# Patient Record
Sex: MALE | NOT HISPANIC OR LATINO | URBAN - METROPOLITAN AREA
[De-identification: names, ages, dates, MRNs, and addresses within clinical notes are randomized per-mention and may not be internally consistent; named-entity substitution may affect disease eponyms.]

---

## 2017-01-05 ENCOUNTER — AMBULATORY - HEALTHEAST (OUTPATIENT)
Dept: SURGERY | Facility: CLINIC | Age: 42
End: 2017-01-05

## 2017-01-05 ENCOUNTER — OFFICE VISIT - HEALTHEAST (OUTPATIENT)
Dept: SURGERY | Facility: CLINIC | Age: 42
End: 2017-01-05

## 2017-01-05 DIAGNOSIS — N61.0 NIPPLE INFECTION: ICD-10-CM

## 2017-01-06 ENCOUNTER — RECORDS - HEALTHEAST (OUTPATIENT)
Dept: ADMINISTRATIVE | Facility: OTHER | Age: 42
End: 2017-01-06

## 2017-01-10 ENCOUNTER — AMBULATORY - HEALTHEAST (OUTPATIENT)
Dept: SURGERY | Facility: CLINIC | Age: 42
End: 2017-01-10

## 2017-01-18 ENCOUNTER — AMBULATORY - HEALTHEAST (OUTPATIENT)
Dept: SURGERY | Facility: CLINIC | Age: 42
End: 2017-01-18

## 2017-01-20 ENCOUNTER — AMBULATORY - HEALTHEAST (OUTPATIENT)
Dept: SURGERY | Facility: CLINIC | Age: 42
End: 2017-01-20

## 2017-01-20 ENCOUNTER — RECORDS - HEALTHEAST (OUTPATIENT)
Dept: ADMINISTRATIVE | Facility: OTHER | Age: 42
End: 2017-01-20

## 2017-01-21 ENCOUNTER — OFFICE VISIT - HEALTHEAST (OUTPATIENT)
Dept: FAMILY MEDICINE | Facility: CLINIC | Age: 42
End: 2017-01-21

## 2017-01-21 DIAGNOSIS — S21.002D OPEN WOUND OF LEFT BREAST, SUBSEQUENT ENCOUNTER: ICD-10-CM

## 2017-01-31 ENCOUNTER — OFFICE VISIT - HEALTHEAST (OUTPATIENT)
Dept: SURGERY | Facility: CLINIC | Age: 42
End: 2017-01-31

## 2017-01-31 DIAGNOSIS — Z48.89 POSTOPERATIVE VISIT: ICD-10-CM

## 2017-02-21 ENCOUNTER — OFFICE VISIT - HEALTHEAST (OUTPATIENT)
Dept: SURGERY | Facility: CLINIC | Age: 42
End: 2017-02-21

## 2017-02-21 DIAGNOSIS — Z48.89 POSTOPERATIVE VISIT: ICD-10-CM

## 2017-09-11 ENCOUNTER — COMMUNICATION - HEALTHEAST (OUTPATIENT)
Dept: INTERNAL MEDICINE | Facility: CLINIC | Age: 42
End: 2017-09-11

## 2018-03-01 ENCOUNTER — COMMUNICATION - HEALTHEAST (OUTPATIENT)
Dept: INTERNAL MEDICINE | Facility: CLINIC | Age: 43
End: 2018-03-01

## 2020-02-06 ENCOUNTER — OFFICE VISIT - HEALTHEAST (OUTPATIENT)
Dept: FAMILY MEDICINE | Facility: CLINIC | Age: 45
End: 2020-02-06

## 2020-02-06 DIAGNOSIS — F41.1 GAD (GENERALIZED ANXIETY DISORDER): ICD-10-CM

## 2020-02-06 DIAGNOSIS — Z76.89 ENCOUNTER TO ESTABLISH CARE: ICD-10-CM

## 2020-02-06 DIAGNOSIS — Z00.00 ROUTINE GENERAL MEDICAL EXAMINATION AT A HEALTH CARE FACILITY: ICD-10-CM

## 2020-02-06 DIAGNOSIS — K59.00 CONSTIPATION, UNSPECIFIED CONSTIPATION TYPE: ICD-10-CM

## 2020-02-06 DIAGNOSIS — R53.83 FATIGUE, UNSPECIFIED TYPE: ICD-10-CM

## 2020-02-06 LAB
ERYTHROCYTE [DISTWIDTH] IN BLOOD BY AUTOMATED COUNT: 11.9 % (ref 11–14.5)
HBA1C MFR BLD: 7.3 % (ref 3.5–6)
HCT VFR BLD AUTO: 46 % (ref 40–54)
HGB BLD-MCNC: 15.3 G/DL (ref 14–18)
MCH RBC QN AUTO: 30.4 PG (ref 27–34)
MCHC RBC AUTO-ENTMCNC: 33.2 G/DL (ref 32–36)
MCV RBC AUTO: 92 FL (ref 80–100)
PLATELET # BLD AUTO: 224 THOU/UL (ref 140–440)
PMV BLD AUTO: 7.8 FL (ref 7–10)
RBC # BLD AUTO: 5.02 MILL/UL (ref 4.4–6.2)
WBC: 7.5 THOU/UL (ref 4–11)

## 2020-02-06 ASSESSMENT — MIFFLIN-ST. JEOR: SCORE: 1992.61

## 2020-02-06 ASSESSMENT — PATIENT HEALTH QUESTIONNAIRE - PHQ9: SUM OF ALL RESPONSES TO PHQ QUESTIONS 1-9: 10

## 2020-02-06 ASSESSMENT — ANXIETY QUESTIONNAIRES
6. BECOMING EASILY ANNOYED OR IRRITABLE: NEARLY EVERY DAY
7. FEELING AFRAID AS IF SOMETHING AWFUL MIGHT HAPPEN: SEVERAL DAYS
2. NOT BEING ABLE TO STOP OR CONTROL WORRYING: MORE THAN HALF THE DAYS
3. WORRYING TOO MUCH ABOUT DIFFERENT THINGS: MORE THAN HALF THE DAYS
1. FEELING NERVOUS, ANXIOUS, OR ON EDGE: SEVERAL DAYS
IF YOU CHECKED OFF ANY PROBLEMS ON THIS QUESTIONNAIRE, HOW DIFFICULT HAVE THESE PROBLEMS MADE IT FOR YOU TO DO YOUR WORK, TAKE CARE OF THINGS AT HOME, OR GET ALONG WITH OTHER PEOPLE: SOMEWHAT DIFFICULT
4. TROUBLE RELAXING: MORE THAN HALF THE DAYS
GAD7 TOTAL SCORE: 13
5. BEING SO RESTLESS THAT IT IS HARD TO SIT STILL: MORE THAN HALF THE DAYS

## 2020-02-07 LAB
ALBUMIN SERPL-MCNC: 4.1 G/DL (ref 3.5–5)
ALP SERPL-CCNC: 94 U/L (ref 45–120)
ALT SERPL W P-5'-P-CCNC: 70 U/L (ref 0–45)
ANION GAP SERPL CALCULATED.3IONS-SCNC: 7 MMOL/L (ref 5–18)
AST SERPL W P-5'-P-CCNC: 34 U/L (ref 0–40)
BILIRUB SERPL-MCNC: 0.3 MG/DL (ref 0–1)
BUN SERPL-MCNC: 12 MG/DL (ref 8–22)
CALCIUM SERPL-MCNC: 9 MG/DL (ref 8.5–10.5)
CHLORIDE BLD-SCNC: 105 MMOL/L (ref 98–107)
CHOLEST SERPL-MCNC: 176 MG/DL
CO2 SERPL-SCNC: 25 MMOL/L (ref 22–31)
CREAT SERPL-MCNC: 0.86 MG/DL (ref 0.7–1.3)
FASTING STATUS PATIENT QL REPORTED: NO
GFR SERPL CREATININE-BSD FRML MDRD: >60 ML/MIN/1.73M2
GLUCOSE BLD-MCNC: 180 MG/DL (ref 70–125)
HDLC SERPL-MCNC: 34 MG/DL
LDLC SERPL CALC-MCNC: 119 MG/DL
POTASSIUM BLD-SCNC: 4.7 MMOL/L (ref 3.5–5)
PROT SERPL-MCNC: 7.2 G/DL (ref 6–8)
SODIUM SERPL-SCNC: 137 MMOL/L (ref 136–145)
TRIGL SERPL-MCNC: 115 MG/DL
TSH SERPL DL<=0.005 MIU/L-ACNC: 1.87 UIU/ML (ref 0.3–5)

## 2020-02-11 ENCOUNTER — COMMUNICATION - HEALTHEAST (OUTPATIENT)
Dept: FAMILY MEDICINE | Facility: CLINIC | Age: 45
End: 2020-02-11

## 2020-02-11 DIAGNOSIS — K59.01 SLOW TRANSIT CONSTIPATION: ICD-10-CM

## 2020-02-11 RX ORDER — DOCUSATE SODIUM 100 MG/1
100 CAPSULE, LIQUID FILLED ORAL 2 TIMES DAILY PRN
Qty: 60 CAPSULE | Refills: 2 | Status: SHIPPED | OUTPATIENT
Start: 2020-02-11

## 2020-02-20 ENCOUNTER — OFFICE VISIT - HEALTHEAST (OUTPATIENT)
Dept: FAMILY MEDICINE | Facility: CLINIC | Age: 45
End: 2020-02-20

## 2020-02-20 DIAGNOSIS — R73.09 ELEVATED HEMOGLOBIN A1C: ICD-10-CM

## 2020-02-20 DIAGNOSIS — K64.4 EXTERNAL HEMORRHOIDS: ICD-10-CM

## 2020-02-20 DIAGNOSIS — K59.00 CONSTIPATION, UNSPECIFIED CONSTIPATION TYPE: ICD-10-CM

## 2020-02-20 DIAGNOSIS — E66.01 MORBID OBESITY (H): ICD-10-CM

## 2020-02-20 LAB — HBA1C MFR BLD: 7.3 % (ref 3.5–6)

## 2020-02-20 RX ORDER — POLYETHYLENE GLYCOL 3350 17 G/17G
17 POWDER, FOR SOLUTION ORAL DAILY
Qty: 100 EACH | Refills: 0 | Status: SHIPPED | OUTPATIENT
Start: 2020-02-20

## 2020-02-20 RX ORDER — MENTHOL AND ZINC OXIDE .44; 20.625 G/100G; G/100G
OINTMENT TOPICAL
Qty: 71 G | Refills: 0 | Status: SHIPPED | OUTPATIENT
Start: 2020-02-20

## 2020-03-12 ENCOUNTER — COMMUNICATION - HEALTHEAST (OUTPATIENT)
Dept: ADMINISTRATIVE | Facility: CLINIC | Age: 45
End: 2020-03-12

## 2020-03-23 ENCOUNTER — OFFICE VISIT - HEALTHEAST (OUTPATIENT)
Dept: FAMILY MEDICINE | Facility: CLINIC | Age: 45
End: 2020-03-23

## 2020-03-23 DIAGNOSIS — E66.01 MORBID OBESITY (H): ICD-10-CM

## 2020-03-23 DIAGNOSIS — K59.00 CONSTIPATION, UNSPECIFIED CONSTIPATION TYPE: ICD-10-CM

## 2020-03-23 DIAGNOSIS — F41.1 GAD (GENERALIZED ANXIETY DISORDER): ICD-10-CM

## 2020-03-23 DIAGNOSIS — E11.9 TYPE 2 DIABETES MELLITUS WITHOUT COMPLICATION, WITHOUT LONG-TERM CURRENT USE OF INSULIN (H): ICD-10-CM

## 2020-03-23 RX ORDER — BUPROPION HYDROCHLORIDE 300 MG/1
300 TABLET ORAL EVERY MORNING
Qty: 90 TABLET | Refills: 1 | Status: SHIPPED | OUTPATIENT
Start: 2020-03-23

## 2020-07-22 ENCOUNTER — COMMUNICATION - HEALTHEAST (OUTPATIENT)
Dept: EDUCATION SERVICES | Facility: CLINIC | Age: 45
End: 2020-07-22

## 2020-09-25 ENCOUNTER — RECORDS - HEALTHEAST (OUTPATIENT)
Dept: ADMINISTRATIVE | Facility: OTHER | Age: 45
End: 2020-09-25

## 2021-04-16 ENCOUNTER — AMBULATORY - HEALTHEAST (OUTPATIENT)
Dept: NURSING | Facility: CLINIC | Age: 46
End: 2021-04-16

## 2021-05-07 ENCOUNTER — AMBULATORY - HEALTHEAST (OUTPATIENT)
Dept: NURSING | Facility: CLINIC | Age: 46
End: 2021-05-07

## 2021-05-27 ASSESSMENT — PATIENT HEALTH QUESTIONNAIRE - PHQ9: SUM OF ALL RESPONSES TO PHQ QUESTIONS 1-9: 10

## 2021-05-28 ASSESSMENT — ANXIETY QUESTIONNAIRES: GAD7 TOTAL SCORE: 13

## 2021-05-30 VITALS — WEIGHT: 232 LBS | BODY MASS INDEX: 35.8 KG/M2

## 2021-05-30 VITALS — WEIGHT: 233.9 LBS | BODY MASS INDEX: 36.09 KG/M2

## 2021-06-04 VITALS
DIASTOLIC BLOOD PRESSURE: 72 MMHG | SYSTOLIC BLOOD PRESSURE: 118 MMHG | WEIGHT: 247.6 LBS | HEART RATE: 80 BPM | BODY MASS INDEX: 37.53 KG/M2 | HEIGHT: 68 IN

## 2021-06-04 VITALS
SYSTOLIC BLOOD PRESSURE: 130 MMHG | WEIGHT: 244.7 LBS | BODY MASS INDEX: 37.21 KG/M2 | HEART RATE: 84 BPM | DIASTOLIC BLOOD PRESSURE: 102 MMHG

## 2021-06-05 NOTE — PATIENT INSTRUCTIONS - HE
"Anxiety:  Start hydroxyzine 25mg every 8 hours as needed during the daytime for anxiety  Next week, start the Wellbutrin 150mg once daily.   We will help schedule you for a therapy appointment    Constipation:  Start colace docusate every day. We can explore these symptoms more at your next visit.    Weight Loss Strategies for Success: (start small, pick 1 or 2 goals each week)  1. Check out the [facility] website to learn about BODPOD and other options for fat/muscle assessments, cooking classes, fitness classes (pilates, yoga, kettleball, etc), , massage  and gym memberships.  (https://www.[employer].org/services/ways-to-wellness)  2. Have a  \"Table, chair, plate\" with every meal. Sit down to eat your food!  3. Brush your teeth after the last meal of the day. Prevents evening snacking.   4. Avoid sugary beverages. (pop, fancy coffees). Reduce alcohol.   5. Drink water instead.   6. No fast food (or reduce meals out to a specific #/week). Eat at home 90% of the time.  7. Keep a food log- apps like [employer] are very helpful for this.  8. Start the morning with breakfast. Every single day.   9. High protein (60g/day): Try to get protein in at least every 3.5 hours during the day to avoid a hunger surge late in the day.  10. If hungry, start with a protein serving, followed by water and then a crunchy vegetable that requires chewing (this decreases the hunger hormone).   11. Use plenty of healthy fats (olive oil, avocados, nuts) when cooking which will make you feel more satisfied.    12. Be intentional and think about what you are eating and feel the food fuel you.  13. Choose one day to be \"meal planning\" and/or \"meal prep\" days. Plan ahead for grocery shopping for healthy food choices, and spend 30min-1hr each week prepping your fruits/veggies (wash, chop, store in easy grab portions).  14. Make it easy to grab the protein (cut up chicken breasts, greek yogurt containers, hard boiled eggs, " etc)  15. Start low intensity exercise 30 minutes/day (walking/hiking/yard work).  16. Goal for 10,000 steps per day (consider a FitBit or other tracking device).  17. Don't step on the scale, but if you must: Measure inches, not weight!

## 2021-06-06 NOTE — PROGRESS NOTES
Assessment/plan   Daryn Cano is a 44 y.o. male who is established to my practice.    Daryn was seen today for hemorrhoids.    Diagnoses and all orders for this visit:    Presumed Internal hemorrhoids and some intermittent external hemorrhoids by clinical hx, not visible on exam today  Clinical history of hemorrhoids and has seen [facility] in the past for this.  He also had a history of anal fissure by his report that was improved with diltiazem/lidocaine compounded cream.  At this time I do not see evidence of a fissure.  Recommended calmoseptine cream.  Recommended avoidance of anal steroid Anusol type products which can thin the skin as much as possible.  Strong and long conversation regarding constipation management.  See next.  Pending improvement in symptoms, may require colonoscopy next.  -     menthol-zinc oxide (CALMOSEPTINE) 0.44-20.6 % Oint ointment; Apply twice daily to affected area.    Constipation, unspecified constipation type  Long conversation about constipation management. Has not optimized dietary fiber, hydration nor other OTC options for softer stools yet. Reviewed importance of these interventions, to start with miralax daily and colace up to twice daily for regular soft BMs in addition to dietary changes. We will revisit the constipation issue next month; can consider colonoscopy if sx not improving. TSH is normal. No anemia. Hb 15.  -     polyethylene glycol (MIRALAX) 17 gram packet; Take 1 packet (17 g total) by mouth daily.    Elevated hemoglobin A1c  Initial A1c 7.3% a few weeks ago, rechecking today to confirm diagnosis of diabetes.  We will discuss this further at the next visit as patient is quite resistant to this thought and already spent much time on the above issues  -     Glycosylated Hemoglobin A1c    Morbid obesity (H)  States his mood is doing really well with Wellbutrin initiation, however the hydroxyzine causes him to feel sleepy, irritated,    Follow up: 3-4  "weeks    Gail Duran MD    Subjective:      HPI: Daryn Cano is a 44 y.o. male who is here for:    Chief Complaint   Patient presents with     Hemorrhoids     follow up       Arrived late to his appointment today.    Discuss hemorrhoids: possible hemorrhoids (he calls these \"piles\").  When he strains at the toilet he notices tissue coming out and it will be quite painful for him.  Constipated for the past 3 to 4 years. Bloated/gasey and hard stools with straining.  He has pain with stooling.  He will sit on the toilet 7 times per day and passed small hard kayla but he has to sit and strain for a long period of time.  Sharp pain.  Sometimes feels like a skin crack or rash.  Passing a lot of flatus all day every day.  Says this is very embarrassing when he is at work, passing gas 20-30 times.  No family history of colon cancer.  He has seen [facility] in the past for what sounds like an anal fissure and management of hemorrhoids.  At that time they had recommended a colonoscopy but he did not want to have a scope performed. He had success with a diltiazem 2%/lidocaine 2% compounded ointment from [facility] at one point in the past for the \"piles\" as he calls them. (unclear if it was for fissure vs hemorrhoids at that time).     Initiated Wellbutrin 150 mg on 2/6 for generalized anxiety.  This medication is working really well to help keep him calm he feels.  Additionally with the hydroxyzine as needed was prescribed however he feels this creates agitation, foggy thinking, and sleepiness so he is no longer taking this 1.    Review of Systems:  Fatigue, see recent physical exam which reviewed this in detail and we will continue to address going forward  Constipation    12 point comprehensive review of systems was negative except as noted and HPI     Social History:  Social History     Social History Narrative    Technology . 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 Walks the dogs daily " 2miles a day. Former smoker ~2/2018. Rare alcohol.     Gail Duran MD       Medical History:  Patient Active Problem List   Diagnosis     Anxiety     BMI 37.0-37.9, adult     Constipation, unspecified constipation type     Fatigue, unspecified type     Impaired glucose tolerance- first A1c 7.3% 2/2020, recheck to confirm dx of diabetes; previously not seen in clinic regularily     Obesity (BMI 35.0-39.9) with comorbidity (H)     Past Medical History:   Diagnosis Date     Anal fissure      Past Surgical History:   Procedure Laterality Date     BREAST BIOPSY Left      BREAST SURGERY Left 02/19/2016    left periareolar abscess     Mass removed on the left chest       Patient has no known allergies.  Family History   Problem Relation Age of Onset     Coronary artery disease Other         PGF, great PGF, F     Cancer Maternal Grandmother         Blood     Breast cancer Maternal Grandmother      Diabetes Other         M, sister     Breast cancer Maternal Aunt 50     Diabetes Mother      Hypertension Mother      Heart disease Father      Diabetes Sister      Anesthesia problems Neg Hx        Medications:  Current Outpatient Medications   Medication Sig Dispense Refill     buPROPion (WELLBUTRIN XL) 150 MG 24 hr tablet Take 1 tablet (150 mg total) by mouth every morning. 30 tablet 2     hydrOXYzine HCl (ATARAX) 25 MG tablet Take 1 tablet (25 mg total) by mouth 3 (three) times a day as needed for anxiety. 30 tablet 0     docusate sodium (COLACE) 100 MG capsule Take 1 capsule (100 mg total) by mouth 2 (two) times a day as needed for constipation. 60 capsule 2     menthol-zinc oxide (CALMOSEPTINE) 0.44-20.6 % Oint ointment Apply twice daily to affected area. 71 g 0     polyethylene glycol (MIRALAX) 17 gram packet Take 1 packet (17 g total) by mouth daily. 100 each 0     No current facility-administered medications for this visit.        Imaging & Labs reviewed this visit: TSH is normal at 1.87      Objective:       Vitals:    02/20/20 1153   BP: 122/90   Pulse: 84   Weight: (!) 244 lb 11.2 oz (111 kg)       Physical Exam:   General: Alert, no acute distress.  Calmer than last visit, less excitable.   HEENT: normocephalic conjunctivae are clear. EOMI  Neck: supple   Lungs: Normal effort  Heart: regular rate  Abdomen: soft   Skin: clear without rash or lesions  Rectal: perirectal skin is intact and nontender to palpation; no visible fissuring; Anoscopy is quite uncomfortable for pt and small internal hemorrhoids noted but not able to advance due to discomfort. Single finger digital exam reveals smooth prostate, normal rectal vault and no lumps; hard stool is palpated at distal tip. No blood upon removing gloved finger.  Neuro: alert, oriented  Psych: mood good, affect appropriate, good eye contact, insight and judgment intact, normal speech pattern.        Gail Duran MD

## 2021-06-06 NOTE — TELEPHONE ENCOUNTER
Reached out to patient and relayed the below message. Patient is questioning if the covering provider can submit a prescription of Colace to the pharmacy. Patient stated it was discussed at his office visit, but the prescription was never sent. Please advise. Thank you, Kendra Humphrey

## 2021-06-06 NOTE — TELEPHONE ENCOUNTER
----- Message from Gail Duran MD sent at 2/10/2020  5:09 PM CST -----  Please call patient and ask if he wants a letter sent with these results:    Daryn, we tested your glucose control and you have very high blood sugars- it was 180 (but I know you were not fasting). We did a another test to see if you have diabetes by measuring the hemoglobin A1c over the past 3 months blood sugar levels and this is in the diabetic range at 7.3%. The diabetes range is having an A1c of 6.5% or higher. To diagnose diabetes, we typically have this test done at least twice to confirm, so at your visit on 2/20 we can recheck it and discuss what all this means further.  We can also talk about options for medications at that point.    Your thyroid function as measured by the TSH was normal.  Your hemoglobin was normal- no anemia.   Thanks,  Gail Duran MD

## 2021-06-07 NOTE — PATIENT INSTRUCTIONS - HE
"Daryn,  At your recent telephone visit we discussed the diagnosis of diabetes.   You have good goals for weight reduction (10-20lbs) and changing your diet habits significantly.   This will also help with your blood pressures.  I placed a referral to the diabetic educator to go through more nutrition concepts.   Below is a list of helpful ideas to consider as well.  We can recheck in office in about 6 months the hemoglobin A1c level.      Weight Loss Strategies for Success: (start small, pick 1 or 2 goals each week)  1. Check out the [employer] website to learn about BODPOD and other options for fat/muscle assessments, cooking classes, fitness classes (pilates, yoga, kettleball, etc), , massage  and gym memberships.  (https://www.[employer]/services/ways-to-wellness)  2. Have a  \"Table, chair, plate\" with every meal. Sit down to eat your food!  3. Brush your teeth after the last meal of the day. Prevents evening snacking.   4. Avoid sugary beverages. (pop, fancy coffees). Reduce alcohol.   5. Drink water instead.   6. No fast food (or reduce meals out to a specific #/week). Eat at home 90% of the time.  7. Keep a food log- apps like [employer] are very helpful for this.  8. Start the morning with breakfast. Every single day.   9. High protein (60g/day): Try to get protein in at least every 3.5 hours during the day to avoid a hunger surge late in the day.  10. If hungry, start with a protein serving, followed by water and then a crunchy vegetable that requires chewing (this decreases the hunger hormone).   11. Use plenty of healthy fats (olive oil, avocados, nuts) when cooking which will make you feel more satisfied.    12. Be intentional and think about what you are eating and feel the food fuel you.  13. Choose one day to be \"meal planning\" and/or \"meal prep\" days. Plan ahead for grocery shopping for healthy food choices, and spend 30min-1hr each week prepping your fruits/veggies (wash, chop, " store in easy grab portions).  14. Make it easy to grab the protein (cut up chicken breasts, greek yogurt containers, hard boiled eggs, etc)  15. Start low intensity exercise 30 minutes/day (walking/hiking/yard work).  16. Goal for 10,000 steps per day (consider a FitBit or other tracking device).  17. Don't step on the scale, but if you must: Measure inches, not weight!

## 2021-06-07 NOTE — PROGRESS NOTES
"Daryn Cano is a 44 y.o. male who is being evaluated via a billable telephone visit.      The patient has been notified of following:     \"This telephone visit will be conducted via a call between you and your physician/provider. We have found that certain health care needs can be provided without the need for a physical exam.  This service lets us provide the care you need with a short phone conversation.  If a prescription is necessary we can send it directly to your pharmacy.  If lab work is needed we can place an order for that and you can then stop by our lab to have the test done at a later time.    If during the course of the call the physician/provider feels a telephone visit is not appropriate, you will not be charged for this service.\"         Daryn Cano complains of    Chief Complaint   Patient presents with     Follow-up     concerned about diabetes- does not check sugars at all, constipation is better with miralax       I have reviewed and updated the patient's Past Medical History, Social History, Family History and Medication List.    ALLERGIES  Patient has no known allergies.      Renea Anderson LPN  ;  "

## 2021-06-07 NOTE — PROGRESS NOTES
PHONE VISIT  Due to current COVID19 pandemic, pt was offered virtual visit in lieu of in office evaluation to limit exposures.      Assessment/plan  Drayn Cano is a 44 y.o. male who is established to my practice.    Type 2 diabetes mellitus without complication, without long-term current use of insulin (H)  New diagnosis of diabetes reviewed.  A1c 7.3%.  Reviewed goal of less than 7%.  He is motivated for dietary and weight loss modifications prior to medication initiation.  He understands metformin and other medications can be helpful for weight loss and well however prefers to start with his own dietary changes which I think is a great start.  -ASCVD risk is 3.7%, declines statin medication at this time.  Aspirin not indicated.  -Diabetic educator referral placed  -Dietary changes reviewed, he has lots of low hanging fruit here with regard to sugary beverages, junk food, meats and other unhealthy choices including high carbs such as rice.  He states he knows what to do.  -Significant increase in physical activity encouraged, already walking 2 miles a day but at a slow pace  -Encouraged annual eye exam.  -Plan for microalbumin, A1c and foot exam at his follow-up in the fall  - Ambulatory referral to Diabetes Education Program (CDE)    Morbid obesity (H)  BMI 37.0-37.9, adult  Risks of obesity were discussed, including co-morbidities such as diabetes, HTN, HLD, CAD and stroke.   - encouraged moderate physical activity of 150 minutes per week  - encouraged healthy dietary choices like eating real foods, increasing protein & vegetables, and watching portion sizes.  - referral to dietician  - declines bariatric referral    PRAFUL (generalized anxiety disorder)  Doing well but he would like to explore potential dose increase in the Wellbutrin from 150 to 300 mg once daily.  Side effects again reviewed.  - buPROPion (WELLBUTRIN XL) 300 MG 24 hr tablet; Take 1 tablet (300 mg total) by mouth every morning.    Constipation,  "unspecified constipation type  Improved with MiraLAX.  Anticipate much improvement with increasing hydration and the above dietary changes as well       COVID19 precautions reviewed, questions answered. Referred to [facility] for latest updates.     Telephone visit start time: 2:54pm  Telephone visit end time: 3:17pm  Total time: 23min    Follow up: fall for physical/labs    Gail Duran MD    Subjective:      HPI: Daryn Cano is a 44 y.o. male who is being evaluated via a billable telephone visit due to COVID19 pandemic precautions.    Chief Complaint   Patient presents with     Follow-up     concerned about diabetes- does not check sugars at all, constipation is better with miralax       Follow up: New dx of diabetes. A1c 7.3% x2.   He endorses eating a lot of bad foods:   \"anything that causes a man to die, I eat these things\".   rice, chips, gyros, goat meat, mutton.   Meat is his weakness. He can think about reducing this  Drinks a lot of sprite/coke each day.  Enjoys sugary desserts and other junk foods.     He is motivated to start working on a diet changes.  He does not want to start taking medications at this point.    He used to be a heavy smoker and gave up over night. He feels if he puts his mind to it he can do these changes with regard to diet/exercise too.     He is taking dogs on a walk every day - 2 miles per day. Has been doing this for the past 5 years.     Mom, sister, grandfather on both sides with hx of diabetes.     Review of Systems:  Constipation is much better with miralax daily. Getting a soft BM daily now.   The hemorrhoids are a lot better also, the calmoseptine cream is helping.  The \"anger medication\" bupropion has been helping him calm down, and would like to consider a dose increase if possible. PRAFUL 7 is 5,  PHQ9 is 6.   12 point comprehensive review of systems was negative except as noted and HPI     Social History:  Social History     Social History Narrative    Technology delivery " manager. 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 Walks the dogs daily 2miles a day. Former smoker ~2/2018. Rare alcohol.     Gail Duran MD       Medical History:   I have reviewed and updated the patient's Past Medical History, Social History, Family History and Medication List.    Medications:  Current Outpatient Medications   Medication Sig Dispense Refill     buPROPion (WELLBUTRIN XL) 300 MG 24 hr tablet Take 1 tablet (300 mg total) by mouth every morning. 90 tablet 1     docusate sodium (COLACE) 100 MG capsule Take 1 capsule (100 mg total) by mouth 2 (two) times a day as needed for constipation. 60 capsule 2     menthol-zinc oxide (CALMOSEPTINE) 0.44-20.6 % Oint ointment Apply twice daily to affected area. 71 g 0     polyethylene glycol (MIRALAX) 17 gram packet Take 1 packet (17 g total) by mouth daily. 100 each 0     No current facility-administered medications for this visit.        Imaging & Labs reviewed this visit:   Lab Results   Component Value Date    HGBA1C 7.3 (H) 02/20/2020           Objective:      There were no vitals filed for this visit.    Physical Exam: Not performed in context of phone visit    Gail Duran MD

## 2021-06-08 NOTE — PROGRESS NOTES
Prior Authorization for patients I&D Surgery today has been approved. New reference #[id number]. This was approved today 1/20/17, however the approval letter has not been generated. Per Shannan at [facility] [phone], the approval letter will be faxed once it has been generated.    Karon Quinn [facility]  Physician    [facility]   [phone]

## 2021-06-08 NOTE — PROGRESS NOTES
Called [facility] to check on status of pt's gap exception authorization for upcoming surgery on 1/20/17. Request is still pending. Informed representative that patient is in pain and has an active infection. Request will be expedited today. She states that [facility] will call once approved. Phone number to contact [facility] is [phone] Option #3 to check status if needed.

## 2021-06-08 NOTE — PROGRESS NOTES
Daryn is scheduled for I&D of chronic left breast abscess with Dr. Metcalf on 1/20/17 at [facility]. Patient was given instructions of arrival time, need a ,  and NPO after midnight. Patient verbalized understanding.     No pre-op needed.    Karon Quinn [facility]  Physician    [facility]   [phone]

## 2021-06-08 NOTE — PROGRESS NOTES
This is a 41-year-old gentleman who comes to see me for second opinion on how to take care of her chronic infection in his left nipple.  The patient had surgery when he was quite young for what sounds like gynecomastia.  More recently he's had problems with chronic infection in the nipple.  Dr. Jones took him to the OR and did an I and D and this was packed and it healed in.  He's developed yet another area of swelling now along the superior edge of the areola.  He had this drained by the radiologist recently.  The patient is a smoker.    Past medical history: Otherwise unremarkable.  He takes no regular medications and has no allergies.      Social history: The patient does smoke daily.    Review of systems is negative for full 12 point review of systems.    Physical exam:  Visit Vitals     /79 (Patient Site: Right Arm, Patient Position: Sitting, Cuff Size: Adult Regular)     Pulse 65     Wt (!) 232 lb (105.2 kg)     SpO2 96%     BMI 35.8 kg/m2     General: Somewhat overweight but healthy gentleman in no acute distress.  Breasts: No abnormalities on the right.  On the left there is scarring around the left nipple consistent with multiple previous surgeries.  Along the superior edge of the areola there is clearly an area of fluctuance consistent with a developing abscess.  Lungs: Clear  CV: Regular    Impression: Chronic left nipple infection.  I explained to him that this is much more common in people who smoke.  If he was not a smoker likely never would've developed at all.  There are 2 options.  We can do a fistulotomy and open this up from the central nipple in an radial fashion out through the current abscess.  Explained to him that you need to remove the entire ductal system to help prevent it from coming back.  The other option would be to remove the entire nipple areolar complex.  I told him if we do that we would for sure take care of the infection and that would be the best way to completely get rid  of it but obviously would leave a cosmetic defect.  After going over this extensively with him and his dad he would like to try once again with the I and D and see if we can get it to resolve.    Plan: Incision and drainage with excision of ductal tissue.  This will be an outpatient procedure done under local anesthetic with IV sedation.  We'll get that set up at his convenience.

## 2021-06-08 NOTE — PROGRESS NOTES
Chief Complaint   Patient presents with     Dressing Change     Left breast, IandD done at [facility] 01/20/2017       HPI:    Patient is here for dressing change. He is s/p I&D abscess by surgery left breast 1/20. He is doing well without fever or chills. Pain is 3/10. No drainage from wound. He has percocet for prn pain control. No hx of diabetes.     ROS: Pertinent ROS noted in HPI.     No Known Allergies    Patient Active Problem List   Diagnosis     Anxiety       Family History   Problem Relation Age of Onset     Coronary artery disease Other      PGF, great PGF, F     Cancer Maternal Grandmother      Blood     Breast cancer Maternal Grandmother      Diabetes Other      M, sister     Breast cancer Maternal Aunt 50     Anesthesia problems Neg Hx        Social History     Social History     Marital status: Single     Spouse name: N/A     Number of children: N/A     Years of education: N/A     Occupational History     Not on file.     Social History Main Topics     Smoking status: Current Every Day Smoker     Smokeless tobacco: Never Used      Comment: E-[employer] CURRENTLY.      Alcohol use No     Drug use: Not on file     Sexual activity: Not on file     Other Topics Concern     Not on file     Social History Narrative         Objective:    Vitals:    01/21/17 1524   BP: 104/72   Pulse: 67   Resp: 16   Temp: 97.8  F (36.6  C)   SpO2: 97%       Gen: NAD  Skin: 1 cm open wound at left breast with packing material in place. No drainage, erythema. Upon removal, there was minimal pus on the packing material, but no in the wound.     Procedure:    Betadine was used to clean around the wound. The packing material was removed without event. The wound was flushed with 2 CC of 2% Lidocaine and expression produced no pus. Then packing material was re-inserted without event. Dressing placed.     Impression:    Open wound left breast    Plan:    Dressing changed.  Patient was advised to return tomorrow for dressing  change, but he and his father said they will do the dressing change at home instead of coming here. They will return for dressing change on Monday if they don't return tomorrow.

## 2021-06-08 NOTE — PROGRESS NOTES
Pt called to notify that his upcoming I & D surgery with Dr. Metcalf on 1/20/17 at the [facility] is out of network with his insurance. I contacted [facility] and requested a gap request for his surgery. Reference # [id number]. They will call with an approval for this surgery within 1 week.

## 2021-06-08 NOTE — PROGRESS NOTES
Daryn comes in for follow-up of the I and D of his left nipple with excision of the chronically infected ducts.  He apparently was very surprised to see an open wound that was packed even though I very explicitly told him and his father who was with him at every single visit exactly what to expect.  He was so unable to look at it that they had to go to an urgent care to have the dressing changed the first time.  His dad has now been doing the dressing changes.    Physical exam:  The wound looks great.  It is healing in well.  There is good granulation tissue.    Impression: Healing left breast wound from an I and D of a chronic left breast abscess.  Reassured him and his father that this will heal from the inside out.  They need to continue to do the dressing changes on a daily basis.  Follow-up with one of the nurse practitioners in 2 weeks to be sure its continuing to heal.

## 2021-06-09 NOTE — PROGRESS NOTES
HPI: Pt is here for follow up of a left nipple abscess removal. He is doing well. Some itching and mild sharp pain occasionally.       There were no vitals taken for this visit.    EXAM:  GENERAL:Appears well    SURGICAL WOUNDS:  Incisions healing well, no enduration or drainage.    .lastlab[CASEREPORT    Assessment/Plan: . Doing well after surgery and should follow up as needed.  Melo Jang PA-C  [facility] Department of Surgery

## 2021-06-16 PROBLEM — E11.9 TYPE 2 DIABETES MELLITUS WITHOUT COMPLICATION, WITHOUT LONG-TERM CURRENT USE OF INSULIN (H): Status: ACTIVE | Noted: 2020-02-10

## 2021-06-16 PROBLEM — E66.01 MORBID OBESITY (H): Status: ACTIVE | Noted: 2020-02-20

## 2021-06-16 PROBLEM — K59.00 CONSTIPATION, UNSPECIFIED CONSTIPATION TYPE: Status: ACTIVE | Noted: 2020-02-06

## 2021-06-16 PROBLEM — R53.83 FATIGUE, UNSPECIFIED TYPE: Status: ACTIVE | Noted: 2020-02-06

## 2021-06-17 NOTE — TELEPHONE ENCOUNTER
Telephone Encounter by Yessenia Gary RD at 7/22/2020  3:29 PM     Author: Yessenia Gary RD Service: -- Author Type: Registered Dietitian    Filed: 7/22/2020  3:34 PM Encounter Date: 7/22/2020 Status: Attested    : Yessenia Gary RD (Registered Dietitian) Cosigner: Gail Duran MD at 7/22/2020  3:46 PM    Attestation signed by Gail Duran MD at 7/22/2020  3:46 PM    Agree with plan. Gail Duran MD                  Called [facility] for telephone diabetes education consult.  He was not available for the call but wanted to reschedule.  He will call office to do that.      He was asking about using continuous glucose senosr,  I will put in prescription to see if insurance will cover.

## 2021-06-18 NOTE — PATIENT INSTRUCTIONS - HE
Patient Instructions by Gail Duran MD at 2/20/2020 11:40 AM     Author: Gail Duran MD Service: -- Author Type: Physician    Filed: 2/20/2020 12:07 PM Encounter Date: 2/20/2020 Status: Addendum    : Gail Duran MD (Physician)    Related Notes: Original Note by Gail Duran MD (Physician) filed at 2/20/2020 11:56 AM       For the constipation:  Do Colace twice daily and add MiraLAX once daily which is a powder to a glass of water to help with constipation.  Try the calmoseptine cream to the rectal area.     Patient Education     Understanding Hemorrhoids    Hemorrhoid tissues are cushions of blood vessels that swell slightly during bowel movements. Too much pressure on the anal canal can make these tissues remain enlarged, become inflamed, and cause symptoms. This can happen both inside and outside the anal canal.  Parts of the anal canal  The parts of the anal canal are:    Internal hemorrhoid tissue is in the upper area of the anal canal.    The rectum is the last several inches of the colon. This is where stool is stored prior to bowel movements.    Anal sphincters are ring-shaped muscles that expand and contract to control the anal opening.    External hemorrhoid tissue lies under the anal skin.    The anus is the passage between the rectum and the outside of the body.  Normal hemorrhoid tissue  Hemorrhoid tissues play an important role in helping your body eliminate waste. Food passes from the stomach through the intestines. The waste (stool) then travels through the colon to the rectum. It is stored in the rectum until its ready to be passed from the anus. During bowel movements, hemorrhoids swell with blood and become slightly larger. This swelling helps protect and cushion the anal canal as stool passes from the body. Once the stool has passed, the tissues stop swelling and return to normal.  Problem hemorrhoids  Pressure due to straining or other factors can  cause hemorrhoid tissues to remain swollen. When this happens to the hemorrhoid tissues in the anal canal theyre called internal hemorrhoids. Swollen tissues around the anal opening are called external hemorrhoids. Depending on the location, your symptoms can differ.    Internal hemorrhoids often happen in clusters around the wall of the anal canal. They are usually painless. But they may prolapse (protrude out of the anus) due to straining or pressure from hard stool. After the bowel movement is over, they may then reduce (return inside the body). Internal hemorrhoids often bleed. They can also discharge mucus.      External hemorrhoids are located at the anal opening, just beneath the skin. These tissues rarely cause problems unless they thrombose (form a blood clot). When this happens, a hard, bluish lump may appear. A thrombosed hemorrhoid also causes sudden, severe pain. In time, the clot may go away on its own. This sometimes leaves a skin tag of tissue stretched by the clot.  Hemorrhoid symptoms  Hemorrhoid symptoms may include:    Pain or a burning sensation    Bleeding during bowel movements    Protrusion of tissue from the anus    Itching around the anus  Causes of hemorrhoids  Theres no single cause of hemorrhoids. Most often, though, they are caused by too much pressure on the anal canal. This can be due to:    Chronic (ongoing) constipation    Straining during bowel movements    Sitting too long on the toilet    Strenuous exercise or heavy lifting    Pregnancy and childbirth    Aging    Diarrhea  Date Last Reviewed: 7/1/2016 2000-2019 [employer]. [address] [place] [address], [place] [address]. All rights reserved. This information is not intended as a substitute for professional medical care. Always follow your healthcare professional's instructions.

## 2021-06-20 NOTE — LETTER
Letter by Gail Duran MD at      Author: Gail Duran MD Service: -- Author Type: --    Filed:  Encounter Date: 2/11/2020 Status: (Other)         Daryn Cano  [address] [place] [address]             February 11, 2020         Dear Mr. Cano,    Below are the results from your recent visit:    Resulted Orders   Comprehensive Metabolic Panel   Result Value Ref Range    Sodium 137 136 - 145 mmol/L    Potassium 4.7 3.5 - 5.0 mmol/L    Chloride 105 98 - 107 mmol/L    CO2 25 22 - 31 mmol/L    Anion Gap, Calculation 7 5 - 18 mmol/L    Glucose 180 (H) 70 - 125 mg/dL    BUN 12 8 - 22 mg/dL    Creatinine 0.86 0.70 - 1.30 mg/dL    GFR MDRD Af Amer >60 >60 mL/min/1.73m2    GFR MDRD Non Af Amer >60 >60 mL/min/1.73m2    Bilirubin, Total 0.3 0.0 - 1.0 mg/dL    Calcium 9.0 8.5 - 10.5 mg/dL    Protein, Total 7.2 6.0 - 8.0 g/dL    Albumin 4.1 3.5 - 5.0 g/dL    Alkaline Phosphatase 94 45 - 120 U/L    AST 34 0 - 40 U/L    ALT 70 (H) 0 - 45 U/L    Narrative    Fasting Glucose reference range is 70-99 mg/dL per  American Diabetes Association (ADA) guidelines.   Glycosylated Hemoglobin A1c   Result Value Ref Range    Hemoglobin A1c 7.3 (H) 3.5 - 6.0 %   Lipid Cascade   Result Value Ref Range    Cholesterol 176 <=199 mg/dL    Triglycerides 115 <=149 mg/dL    HDL Cholesterol 34 (L) >=40 mg/dL    LDL Calculated 119 <=129 mg/dL    Patient Fasting > 8hrs? No    Thyroid Stimulating Hormone (TSH)   Result Value Ref Range    TSH 1.87 0.30 - 5.00 uIU/mL   HM2(CBC w/o Differential)   Result Value Ref Range    WBC 7.5 4.0 - 11.0 thou/uL    RBC 5.02 4.40 - 6.20 mill/uL    Hemoglobin 15.3 14.0 - 18.0 g/dL    Hematocrit 46.0 40.0 - 54.0 %    MCV 92 80 - 100 fL    MCH 30.4 27.0 - 34.0 pg    MCHC 33.2 32.0 - 36.0 g/dL    RDW 11.9 11.0 - 14.5 %    Platelets 224 140 - 440 thou/uL    MPV 7.8 7.0 - 10.0 fL        Daryn, we tested your glucose control and you have very high blood sugars- it was 180 (but I know you  were not fasting).  We did a another test to see if you have diabetes by measuring the hemoglobin A1c  over the past 3 months blood sugar levels and this is in the diabetic range at 7.3%. The diabetes range  is having an A1c of 6.5% or higher. To diagnose diabetes, we typically have this test done at least twice  to confirm, so at your visit on 2/20 we can recheck it and discuss what all this means further.  We can  also talk about options for medications at that point.      Your thyroid function as measured by the TSH was normal.    Your hemoglobin was normal- no anemia.     Please call with questions or contact us using [employer].    Sincerely,        Electronically signed by Gail Duran MD

## 2021-06-26 ENCOUNTER — HEALTH MAINTENANCE LETTER (OUTPATIENT)
Age: 46
End: 2021-06-26

## 2021-06-28 NOTE — PROGRESS NOTES
"Progress Notes by Gail Duran MD at 2/6/2020  4:20 PM     Author: Gail Duran MD Service: -- Author Type: Physician    Filed: 2/6/2020  5:18 PM Encounter Date: 2/6/2020 Status: Signed    : Gail Duran MD (Physician)       MALE PREVENTATIVE EXAM    Assessment and Plan:     Daryn was seen today for establish care, annual exam, fatigue and constipation.    Encounter to establish care    Routine general medical examination at a health care facility  -     Comprehensive Metabolic Panel  -     Lipid Cascade  -     Thyroid Stimulating Hormone (TSH)  -     HM2(CBC w/o Differential)  -     Tdap vaccine greater than or equal to 6yo IM  -     Influenza,Seasonal,Quad,INJ =/>6months    BMI 37.0-37.9, adult  Risks of obesity were discussed, including co-morbidities such as diabetes, HTN, HLD, CAD and stroke.   - encouraged moderate physical activity of 150 minutes per week  - encouraged healthy dietary choices like eating real foods, increasing protein & vegetables, and watching portion sizes.  - offered referral to dietician; pt declined  -     Comprehensive Metabolic Panel  -     Glycosylated Hemoglobin A1c  -     Thyroid Stimulating Hormone (TSH)    NICK (generalized anxiety disorder)  Nick 7 score of 13 and PHQ 9 score of 10, primarily scoring for feeling down about his weight, and fatigue.  He was previously offered a trial of Paxil but only took for 2 days because \"it did nothing \".  We reviewed at length that medications for mood management tend to take 4 to 8 weeks to really see a good effect.  Reviewed side effect profile of SSRIs versus Wellbutrin, and given his weight he desires to start with this route.  Additionally reviewed option for hydroxyzine to be used as needed throughout the daytime for anxiety symptoms, and side effects discussed.  He is agreeable to therapist.  -     buPROPion (WELLBUTRIN XL) 150 MG 24 hr tablet; Take 1 tablet (150 mg total) by mouth every morning.  -     " hydrOXYzine HCl (ATARAX) 25 MG tablet; Take 1 tablet (25 mg total) by mouth 3 (three) times a day as needed for anxiety.    Fatigue  Certainly multifactorial.  Of primary importance is the amount of emotional fatigue he experiences throughout the day with the high stress and pressures of his job leading to physical fatigue by the end of the day.  He does sleep very well in the evenings.  No exertional chest pain or dyspnea. He declines evaluation for sleep apnea, no witnessed snoring or apnea but he sleeps alone.  He is notably obese, and eats a poor diet.  We discussed anti-inflammatory foods to help with fatigue.  Also reviewed the importance of exercise for management of fatigue.  We will evaluate with labs to rule out electrolyte abnormality, anemia, thyroid disease.  We will explore this further at follow-up visits.      Constipation, unspecified constipation type, with possible hemorrhoids  Due to time constraints with the evaluation and management discussed above for his weight, anxiety and fatigue concerns, we did not delve into the constipation issue at length.  I suspect a primary component to be nutritional and we will evaluate further at follow-up visit along with the hemorrhoid evaluation.  Strong recommendation for daily Colace at this point    I spent 25 minutes with the patient specifically addressing medical issues and problems as above, >50% of which was in counseling.  This was in addition to routine preventative health discussions.     Next follow up:  Return in about 1 week (around 2/13/2020) for Recheck.    Immunization Review  Adult Imm Review: Due today, orders placed       I discussed the following with the patient:   Adult Healthy Living: Importance of regular exercise  Healthy nutrition  Weight loss referral options  Getting adequate sleep  Stress management  Use of seat belts  Contraception options  Supplement use  Herbal medications/alternative medical therapies    I have had an Advance  "Directives discussion with the patient.    Subjective:   Chief Complaint: Daryn Cano is an 44 y.o. male here for a preventative health visit.     HPI:    Chief Complaint   Patient presents with   ? Establish Care   ? Annual Exam     not fasting   ? Fatigue     walks regularly with his dogs and feels like he cannot lose weight, concerned about thyroid   ? Constipation     feels like he struggles with this every 15-20 days and will take docusate and ranitidine and it will go away     His father is with him today.  He has not regularly seen the doctor ever before.    Concern for hypothyroidism.  No family history of this.  Fatigue.  Feels like he is emotionally exhausted by the end of his workday because he is in a high pressure job and feels pressure to keep working.  No exertional chest pain or shortness of breath.  Sleeps well at night \"an earthquake could not wake me \".  Unknown if he snores or has apneic events as he sleeps alone.  Walks dogs twice a day for 1 mile each time, can't loose weight.  He endorses a very poor diet and no intention to change this at this time because \"I like my meat and chips \".    Anxiety: High stress job.  Has been diagnosed with anxiety in the past but not on medications consistently.  Says he has an \"anger\" issue- impatient, anxious, irritability. Easily frusterated. He was on Paxil for 2 tabs and didn't make any difference so he stopped it.  He did not realize he was supposed to take it for a full month for any effect.    Constipated a few times per month. Bloated/gasey and hard stools with straining, possible hemorrhoids (he calls these \"piles\"). Docusate and ranitidine help? He has pain with stooling. Sharp pain. Has BMs 4-5 times per day. Passing a lot of flatus all day every day.       Hx of abscess on left chest s/p surgical removal decades year ago.    Social History     Social History Narrative    Technology . 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 " "Walks the dogs daily 2miles a day. Former smoker ~2/2018. Rare alcohol.     Gail Duran MD       Healthy Habits  Are you taking a daily aspirin? No  Do you typically exercising at least 40 min, 3-4 times per week?  NO  Do you usually eat at least 4 servings of fruit and vegetables a day, include whole grains and fiber and avoid regularly eating high fat foods? NO  Have you had an eye exam in the past two years? NO  Do you see a dentist twice per year? NO  Do you have any concerns regarding sleep? No    Safety Screen  If you own firearms, are they secured in a locked gun cabinet or with trigger locks? The patient does not own any firearms  No data recorded    Review of Systems:  Please see above.  The rest of the review of systems are negative for all systems.     Cancer Screening     Patient has no health maintenance due at this time          Patient Care Team:  Gail Duran MD as PCP - General (Family Medicine)        History     Reviewed By Date/Time Sections Reviewed    Gail Duran MD 2/6/2020  5:05 PM Medical, Surgical, Tobacco, Family    Gail Duran MD 2/6/2020  4:43 PM Social Documentation            Objective:   Vital Signs:   Visit Vitals  /72 (Patient Site: Left Arm, Patient Position: Sitting, Cuff Size: Adult Large)   Pulse 80   Ht 5' 8\" (1.727 m)   Wt (!) 247 lb 9.6 oz (112.3 kg)   BMI 37.65 kg/m           PHYSICAL EXAM  Physical Exam:  Constitutional: Patient is oriented to person, place, and time. Patient appears well-developed and well-nourished. No distress.   Head: Normocephalic and atraumatic.   Ears: TMs normal bilaterally   Nose: no discharge or polyps  Mouth/Throat: Crowded airway.  Oropharynx is clear and moist. No oropharyngeal exudate.   Eyes: Conjunctivae and EOM are normal. Pupils are equal, round, and reactive to light. No scleral icterus or discharge.  Neck: Thick neck.  Neck supple. No JVD present. No tracheal deviation. No thyromegaly. "   Cardiovascular: Normal rate, regular rhythm, normal heart sounds and intact distal pulses. No murmur heard.   Pulmonary/Chest: Effort normal and breath sounds are clear to auscultation bilaterally.  Abdominal: Obese. Soft. Bowel sounds are normal. Nodistension and no mass.  There is no tenderness.   Lymphadenopathy:  No cervical adenopathy.   Neurological: Alert and oriented to person, place, and time. 2+ patellar DTRs. No gross cranial nerve deficit. Coordination normal.   Skin: Skin is warm and dry. No rash noted.   Psychiatric: Anxious, pressured speech, rapidly escalating in tone and volume. Judgment and thought content normal.  Denies suicidal ideations.  Speech is regular rate and rhythm.         The ASCVD Risk score ([place] DC Jr., et al., 2013) failed to calculate for the following reasons:    Cannot find a previous HDL lab    Cannot find a previous total cholesterol lab    Unable to determine if patient is Non- African American         Medication List          Accurate as of February 6, 2020  5:14 PM. If you have any questions, ask your nurse or doctor.            START taking these medications    buPROPion 150 MG 24 hr tablet  Also known as:  Wellbutrin XL  INSTRUCTIONS:  Take 1 tablet (150 mg total) by mouth every morning.  Started by:  Gail Duran MD        hydrOXYzine HCl 25 MG tablet  Also known as:  ATARAX  INSTRUCTIONS:  Take 1 tablet (25 mg total) by mouth 3 (three) times a day as needed for anxiety.  Started by:  Gail Duran MD           STOP taking these medications    PARoxetine 30 MG tablet  Also known as:  PAXIL  Stopped by:  Gail Duran MD           Where to Get Your Medications      These medications [facility] to [id number] - [place], [place] - [address] AT [facility] [place] [facility]  [address], [facility] [address]    Hours:  24-hours Phone:  [phone]     buPROPion 150 MG 24 hr tablet    hydrOXYzine HCl 25 MG tablet          Additional Screenings Completed Today:   See flow sheets, PHQ 9 score of 10 and sonny 7 score of 13.

## 2021-10-16 ENCOUNTER — HEALTH MAINTENANCE LETTER (OUTPATIENT)
Age: 46
End: 2021-10-16

## 2021-11-03 ENCOUNTER — IMMUNIZATION (OUTPATIENT)
Dept: NURSING | Facility: CLINIC | Age: 46
End: 2021-11-03
Payer: COMMERCIAL

## 2021-11-03 PROCEDURE — 91300 PR COVID VAC PFIZER DIL RECON 30 MCG/0.3 ML IM: CPT

## 2021-11-03 PROCEDURE — 0004A PR COVID VAC PFIZER DIL RECON 30 MCG/0.3 ML IM: CPT

## 2022-02-05 ENCOUNTER — HEALTH MAINTENANCE LETTER (OUTPATIENT)
Age: 47
End: 2022-02-05

## 2022-05-28 ENCOUNTER — HEALTH MAINTENANCE LETTER (OUTPATIENT)
Age: 47
End: 2022-05-28

## 2022-07-17 ENCOUNTER — HEALTH MAINTENANCE LETTER (OUTPATIENT)
Age: 47
End: 2022-07-17

## 2022-09-25 ENCOUNTER — HEALTH MAINTENANCE LETTER (OUTPATIENT)
Age: 47
End: 2022-09-25

## 2023-02-04 ENCOUNTER — HEALTH MAINTENANCE LETTER (OUTPATIENT)
Age: 48
End: 2023-02-04

## 2023-05-13 ENCOUNTER — HEALTH MAINTENANCE LETTER (OUTPATIENT)
Age: 48
End: 2023-05-13

## 2023-08-06 ENCOUNTER — HEALTH MAINTENANCE LETTER (OUTPATIENT)
Age: 48
End: 2023-08-06

## 2023-10-15 ENCOUNTER — HEALTH MAINTENANCE LETTER (OUTPATIENT)
Age: 48
End: 2023-10-15

## 2024-03-02 ENCOUNTER — HEALTH MAINTENANCE LETTER (OUTPATIENT)
Age: 49
End: 2024-03-02

## 2024-07-20 ENCOUNTER — HEALTH MAINTENANCE LETTER (OUTPATIENT)
Age: 49
End: 2024-07-20

## 2024-09-28 ENCOUNTER — HEALTH MAINTENANCE LETTER (OUTPATIENT)
Age: 49
End: 2024-09-28

## 2025-05-30 ENCOUNTER — OFFICE VISIT (OUTPATIENT)
Dept: URGENT CARE | Facility: URGENT CARE | Age: 50
End: 2025-05-30
Payer: COMMERCIAL

## 2025-05-30 VITALS
BODY MASS INDEX: 27.49 KG/M2 | DIASTOLIC BLOOD PRESSURE: 85 MMHG | WEIGHT: 181.4 LBS | HEART RATE: 67 BPM | TEMPERATURE: 98.1 F | HEIGHT: 68 IN | OXYGEN SATURATION: 98 % | SYSTOLIC BLOOD PRESSURE: 122 MMHG | RESPIRATION RATE: 16 BRPM

## 2025-05-30 DIAGNOSIS — L08.9 INFECTED SEBACEOUS CYST OF SKIN: Primary | ICD-10-CM

## 2025-05-30 DIAGNOSIS — L72.3 INFECTED SEBACEOUS CYST OF SKIN: Primary | ICD-10-CM

## 2025-05-30 PROCEDURE — 3074F SYST BP LT 130 MM HG: CPT | Performed by: PHYSICIAN ASSISTANT

## 2025-05-30 PROCEDURE — 87186 SC STD MICRODIL/AGAR DIL: CPT | Performed by: PHYSICIAN ASSISTANT

## 2025-05-30 PROCEDURE — 87205 SMEAR GRAM STAIN: CPT | Performed by: PHYSICIAN ASSISTANT

## 2025-05-30 PROCEDURE — 87070 CULTURE OTHR SPECIMN AEROBIC: CPT | Performed by: PHYSICIAN ASSISTANT

## 2025-05-30 PROCEDURE — 10060 I&D ABSCESS SIMPLE/SINGLE: CPT | Performed by: PHYSICIAN ASSISTANT

## 2025-05-30 PROCEDURE — 87077 CULTURE AEROBIC IDENTIFY: CPT | Performed by: PHYSICIAN ASSISTANT

## 2025-05-30 PROCEDURE — 3079F DIAST BP 80-89 MM HG: CPT | Performed by: PHYSICIAN ASSISTANT

## 2025-05-30 PROCEDURE — 99203 OFFICE O/P NEW LOW 30 MIN: CPT | Mod: 25 | Performed by: PHYSICIAN ASSISTANT

## 2025-05-30 RX ORDER — LIDOCAINE HYDROCHLORIDE AND EPINEPHRINE 10; 10 MG/ML; UG/ML
10 INJECTION, SOLUTION INFILTRATION; PERINEURAL ONCE
Status: DISCONTINUED | OUTPATIENT
Start: 2025-05-30 | End: 2025-06-02

## 2025-05-30 RX ORDER — CEFADROXIL 500 MG/1
500 CAPSULE ORAL 2 TIMES DAILY
Qty: 20 CAPSULE | Refills: 0 | Status: SHIPPED | OUTPATIENT
Start: 2025-05-30 | End: 2025-06-09

## 2025-05-30 NOTE — PATIENT INSTRUCTIONS
Keep the wound clean dry and protected    Change the dressing dressing with adequate dressing material that was dispensed in the office if it should get wet or dirty    Take the oral antibiotic as written    Return to clinic in 10 to 12 days for suture removal.

## 2025-05-30 NOTE — PROGRESS NOTES
Patient presents with:  Back Problem: States has sore on back possible cyst is painful       Clinical Decision Making:  Patient had excision of the sebaceous cyst and had primary closure.  The cyst was removed in entirety and a culture was obtained from the wound.  Cefadroxil for treatment for staph and strep.  Adjustment to the antibiotic treatment will be performed if the culture indicates change.  Otherwise patient will take antibiotic as written and return to clinic in 12 to 14 days for suture removal sooner if complication or concerns arise.  In the interim, he will monitor his blood glucose levels for treatment during his infection.      ICD-10-CM    1. Infected sebaceous cyst of skin  L72.3 cefadroxil (DURICEF) 500 MG capsule    L08.9 DRAIN SKIN ABSCESS SIMPLE/SINGLE     Cyst Aerobic Bacterial Culture Routine With Gram Stain     DISCONTINUED: lidocaine 1% with EPINEPHrine 1:100,000 injection 10 mL          Patient Instructions     Keep the wound clean dry and protected    Change the dressing dressing with adequate dressing material that was dispensed in the office if it should get wet or dirty    Take the oral antibiotic as written    Return to clinic in 10 to 12 days for suture removal.    HPI:  Daryn Cano is a 49 year old male who has a PMH of DMT2 who presents today for infected sebaceous cyst on his posterior thoracic chest wall in the scapular region.  Patient states has become red swollen tender and painful.  He like to have it evaluated and treated today.  At this time he does not have constitutional symptoms to include fever chills night sweats or fatigue.  No treatments tried for this at home.    History obtained from chart review and the patient.    Problem List:  2020-02: Obesity (BMI 35.0-39.9) with comorbidity (H)  2020-02: Type 2 diabetes mellitus without complication, without long-  term current use of insulin (H)  2020-02: BMI 37.0-37.9, adult  2020-02: Constipation, unspecified constipation  "type  2020: Fatigue, unspecified type  2016: Anxiety      Past Medical History:   Diagnosis Date    Anal fissure     Type 2 diabetes mellitus without complication, without long-term current use of insulin (H)        Social History     Tobacco Use    Smoking status: Former     Current packs/day: 0.00     Types: Cigarettes     Quit date: 2018     Years since quittin.3    Smokeless tobacco: Never   Substance Use Topics    Alcohol use: No       Review of Systems  As above in HPI otherwise negative.    Vitals:    25 1118   BP: 122/85   Pulse: 67   Resp: 16   Temp: 98.1  F (36.7  C)   TempSrc: Oral   SpO2: 98%   Weight: 82.3 kg (181 lb 6.4 oz)   Height: 1.727 m (5' 8\")       General: Patient is resting comfortably no acute distress is afebrile  HEENT: Head is normocephalic atraumatic   Skin: Posterior thoracic chest wall has a very large infected sebaceous cyst that is 9 cm x 6 cm.  It is very tender to palpation.    Physical Exam    [unfilled]    SKIN: Incision and drainage    Date/Time: 6/3/2025 2:23 PM    Performed by: Mickey Romero PA-C  Authorized by: Mickey Romero PA-C      UNIVERSAL PROTOCOL   Site Marked: Yes  Prior Images Obtained and Reviewed:  Yes  Required items: Required blood products, implants, devices and special equipment available    Patient identity confirmed:  Verbally with patient  Patient was reevaluated immediately before administering moderate or deep sedation or anesthesia  Confirmation Checklist:  Patient's identity using two indicators, relevant allergies, procedure was appropriate and matched the consent or emergent situation and correct equipment/implants were available  Time out: Immediately prior to the procedure a time out was called    Universal Protocol: the Joint Commission Universal Protocol was followed    Preparation: Patient was prepped and draped in usual sterile fashion      ANESTHESIA  local infiltration  Local anesthesia used: yes  Local Anesthetic: " lidocaine 1% with epinephrine  Anesthetic total: 10 mL      SEDATION    Patient Sedated: No      Type: abscess  Body area: trunk  Location details: back  Scalpel size: 15  Incision type: single straight  Incision depth: subcutaneous  Complexity: simple  Drainage: purulent  Drainage amount: copious  Wound treatment: The entire capsule of the cyst was excised the wound was irrigated and was closed in a primary fashion with 4 3-0 nylon sutures.      PROCEDURE  Describe Procedure: Dressing consisting of 4 x 4 and foam tape was applied for dressing  Patient Tolerance:  Patient tolerated the procedure well with no immediate complications  Length of time physician/provider present for 1:1 monitoring during sedation: 0            Labs:  Results for orders placed or performed in visit on 05/30/25   Cyst Aerobic Bacterial Culture Routine With Gram Stain     Status: Abnormal    Specimen: Back, Upper; Cyst   Result Value Ref Range    Culture 1+ Proteus mirabilis (A)     Culture 1+ Normal praveena     Gram Stain Result 4+ Gram positive cocci (A)     Gram Stain Result 4+ WBC seen (A)        Susceptibility    Proteus mirabilis - JOANNE*     Ampicillin <=2 Susceptible ug/mL     Ampicillin/ Sulbactam <=2 Susceptible ug/mL     Piperacillin/Tazobactam <=4 Susceptible ug/mL     Ceftazidime <=0.5 Susceptible ug/mL     Ceftriaxone <=0.25 Susceptible ug/mL     Cefepime <=0.12 Susceptible ug/mL     Gentamicin <=1 Susceptible ug/mL     Ciprofloxacin <=0.06 Susceptible ug/mL     Levofloxacin <=0.12 Susceptible ug/mL     Trimethoprim/Sulfamethoxazole <=1/19 Susceptible ug/mL     *           At the end of the encounter, I discussed results, diagnosis, medications. Discussed red flags for immediate return to clinic/ER, as well as indications for follow up if no improvement. Patient understood and agreed to plan. Patient was stable for discharge.

## 2025-05-30 NOTE — PROGRESS NOTES
Urgent Care Clinic Visit    Chief Complaint   Patient presents with    Back Problem     States has sore on back possible cyst is painful

## 2025-05-31 ENCOUNTER — RESULTS FOLLOW-UP (OUTPATIENT)
Dept: URGENT CARE | Facility: URGENT CARE | Age: 50
End: 2025-05-31

## 2025-06-02 LAB
BACTERIA FLD CULT: ABNORMAL
BACTERIA FLD CULT: ABNORMAL
GRAM STAIN RESULT: ABNORMAL
GRAM STAIN RESULT: ABNORMAL

## 2025-06-07 ENCOUNTER — HEALTH MAINTENANCE LETTER (OUTPATIENT)
Age: 50
End: 2025-06-07

## 2025-06-11 ENCOUNTER — OFFICE VISIT (OUTPATIENT)
Dept: URGENT CARE | Facility: URGENT CARE | Age: 50
End: 2025-06-11
Payer: COMMERCIAL

## 2025-06-11 VITALS
WEIGHT: 181 LBS | DIASTOLIC BLOOD PRESSURE: 70 MMHG | HEART RATE: 67 BPM | TEMPERATURE: 98.1 F | OXYGEN SATURATION: 98 % | BODY MASS INDEX: 27.52 KG/M2 | RESPIRATION RATE: 16 BRPM | SYSTOLIC BLOOD PRESSURE: 114 MMHG

## 2025-06-11 DIAGNOSIS — Z48.02 VISIT FOR SUTURE REMOVAL: Primary | ICD-10-CM

## 2025-06-11 PROCEDURE — 3074F SYST BP LT 130 MM HG: CPT | Performed by: FAMILY MEDICINE

## 2025-06-11 PROCEDURE — 99024 POSTOP FOLLOW-UP VISIT: CPT | Performed by: FAMILY MEDICINE

## 2025-06-11 PROCEDURE — 3078F DIAST BP <80 MM HG: CPT | Performed by: FAMILY MEDICINE

## 2025-06-11 NOTE — PROGRESS NOTES
Urgent Care Clinic Visit  {Rapid Rooming (Optional):517438}  Chief Complaint   Patient presents with    Suture Removal     Suture removal            {(!) Visit Details have not yet been documented.  Please enter Visit Details and then use this list to pull in documentation. (Optional):[id number]}  {MA/LPN/RN Pre-Provider Visit Orders- hCG/UA/Strep/Influenza/Vaginal Infection (Optional):381002}

## 2025-06-12 NOTE — PROGRESS NOTES
Assessment/Plan:   1. Visit for suture removal (Primary)    #3 sutures removed without difficulty. Wound is well healed, no infection.       I discussed red flag symptoms, return precautions to clinic/ER and follow up care with patient/parent.  Expected clinical course, symptomatic cares advised. Questions answered. Patient/parent amenable with plan.        Subjective:     Daryn Cano is a 49 year old male who presents ***    No Known Allergies    Current Outpatient Medications   Medication Sig Dispense Refill    buPROPion (WELLBUTRIN XL) 300 MG 24 hr tablet [BUPROPION (WELLBUTRIN XL) 300 MG 24 HR TABLET] Take 1 tablet (300 mg total) by mouth every morning. 90 tablet 1    docusate sodium (COLACE) 100 MG capsule [DOCUSATE SODIUM (COLACE) 100 MG CAPSULE] Take 1 capsule (100 mg total) by mouth 2 (two) times a day as needed for constipation. 60 capsule 2    menthol-zinc oxide (CALMOSEPTINE) 0.44-20.6 % Oint ointment [MENTHOL-ZINC OXIDE (CALMOSEPTINE) 0.44-20.6 % OINT OINTMENT] Apply twice daily to affected area. 71 g 0    polyethylene glycol (MIRALAX) 17 gram packet [POLYETHYLENE GLYCOL (MIRALAX) 17 GRAM PACKET] Take 1 packet (17 g total) by mouth daily. 100 each 0     No current facility-administered medications for this visit.     Patient Active Problem List   Diagnosis    Anxiety    BMI 37.0-37.9, adult    Constipation, unspecified constipation type    Fatigue, unspecified type    Type 2 diabetes mellitus without complication, without long-term current use of insulin (H)    Obesity (BMI 35.0-39.9) with comorbidity (H)       Objective:     /70   Pulse 67   Temp 98.1  F (36.7  C) (Oral)   Resp 16   Wt 82.1 kg (181 lb)   SpO2 98%   BMI 27.52 kg/m      Physical        Results for orders placed or performed in visit on 05/30/25   SKIN: Incision and drainage     Status: None    Narrative    Mickey Romero PA-C     6/3/2025  2:29 PM  [unfilled]    SKIN: Incision and drainage    Date/Time: 6/3/2025 2:23  PM    Performed by: Mickey Romero PA-C  Authorized by: Mickey Romero PA-C      UNIVERSAL PROTOCOL   Site Marked: Yes  Prior Images Obtained and Reviewed:  Yes  Required items: Required blood products, implants, devices and special   equipment available    Patient identity confirmed:  Verbally with patient  Patient was reevaluated immediately before administering moderate or deep   sedation or anesthesia  Confirmation Checklist:  Patient's identity using two indicators, relevant   allergies, procedure was appropriate and matched the consent or emergent   situation and correct equipment/implants were available  Time out: Immediately prior to the procedure a time out was called    Universal Protocol: the Joint Commission Universal Protocol was followed    Preparation: Patient was prepped and draped in usual sterile fashion      ANESTHESIA  local infiltration  Local anesthesia used: yes  Local Anesthetic: lidocaine 1% with epinephrine  Anesthetic total: 10 mL      SEDATION    Patient Sedated: No      Type: abscess  Body area: trunk  Location details: back  Scalpel size: 15  Incision type: single straight  Incision depth: subcutaneous  Complexity: simple  Drainage: purulent  Drainage amount: copious  Wound treatment: The entire capsule of the cyst was excised the wound was   irrigated and was closed in a primary fashion with 4 3-0 nylon sutures.      PROCEDURE  Describe Procedure: Dressing consisting of 4 x 4 and foam tape was applied   for dressing  Patient Tolerance:  Patient tolerated the procedure well with no immediate   complications  Length of time physician/provider present for 1:1 monitoring during   sedation: 0   Cyst Aerobic Bacterial Culture Routine With Gram Stain     Status: Abnormal    Specimen: Back, Upper; Cyst   Result Value Ref Range    Culture 1+ Proteus mirabilis (A)     Culture 1+ Normal praveena     Gram Stain Result 4+ Gram positive cocci (A)     Gram Stain Result 4+ WBC seen (A)         Susceptibility    Proteus mirabilis - JOANNE*     Ampicillin <=2 Susceptible ug/mL     Ampicillin/ Sulbactam <=2 Susceptible ug/mL     Piperacillin/Tazobactam <=4 Susceptible ug/mL     Ceftazidime <=0.5 Susceptible ug/mL     Ceftriaxone <=0.25 Susceptible ug/mL     Cefepime <=0.12 Susceptible ug/mL     Gentamicin <=1 Susceptible ug/mL     Ciprofloxacin <=0.06 Susceptible ug/mL     Levofloxacin <=0.12 Susceptible ug/mL     Trimethoprim/Sulfamethoxazole <=1/19 Susceptible ug/mL     *           This note has been dictated in part using voice recognition software.  Any grammatical or context distortions are unintentional and inherent to the software.  Please feel free to contact me directly for clarification if needed.